# Patient Record
Sex: FEMALE | Race: WHITE
[De-identification: names, ages, dates, MRNs, and addresses within clinical notes are randomized per-mention and may not be internally consistent; named-entity substitution may affect disease eponyms.]

---

## 2019-03-03 ENCOUNTER — HOSPITAL ENCOUNTER (EMERGENCY)
Dept: HOSPITAL 91 - FTE | Age: 49
Discharge: HOME | End: 2019-03-03
Payer: COMMERCIAL

## 2019-03-03 ENCOUNTER — HOSPITAL ENCOUNTER (EMERGENCY)
Dept: HOSPITAL 10 - FTE | Age: 49
Discharge: HOME | End: 2019-03-03
Payer: MEDICAID

## 2019-03-03 VITALS
WEIGHT: 218.26 LBS | HEIGHT: 64 IN | WEIGHT: 218.26 LBS | BODY MASS INDEX: 37.26 KG/M2 | BODY MASS INDEX: 37.26 KG/M2 | HEIGHT: 64 IN

## 2019-03-03 VITALS — HEART RATE: 68 BPM | RESPIRATION RATE: 18 BRPM | DIASTOLIC BLOOD PRESSURE: 79 MMHG | SYSTOLIC BLOOD PRESSURE: 169 MMHG

## 2019-03-03 DIAGNOSIS — S49.91XA: ICD-10-CM

## 2019-03-03 DIAGNOSIS — S39.012A: Primary | ICD-10-CM
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
## 2019-03-03 PROCEDURE — 96372 THER/PROPH/DIAG INJ SC/IM: CPT

## 2019-03-03 PROCEDURE — 99284 EMERGENCY DEPT VISIT MOD MDM: CPT

## 2019-03-03 RX ADMIN — KETOROLAC TROMETHAMINE 1 MG: 30 INJECTION, SOLUTION INTRAMUSCULAR at 20:16

## 2019-03-03 RX ADMIN — DIAZEPAM 1 MG: 2 TABLET ORAL at 20:20
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
